# Patient Record
Sex: FEMALE | Race: WHITE | HISPANIC OR LATINO | Employment: STUDENT | URBAN - METROPOLITAN AREA
[De-identification: names, ages, dates, MRNs, and addresses within clinical notes are randomized per-mention and may not be internally consistent; named-entity substitution may affect disease eponyms.]

---
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
## 2022-05-04 ENCOUNTER — APPOINTMENT (OUTPATIENT)
Dept: LAB | Facility: CLINIC | Age: 22
End: 2022-05-04

## 2022-05-04 ENCOUNTER — OCCMED (OUTPATIENT)
Dept: URGENT CARE | Facility: CLINIC | Age: 22
End: 2022-05-04

## 2022-05-04 DIAGNOSIS — Z02.1 PRE-EMPLOYMENT HEALTH SCREENING EXAMINATION: ICD-10-CM

## 2022-05-04 DIAGNOSIS — Z02.1 PRE-EMPLOYMENT HEALTH SCREENING EXAMINATION: Primary | ICD-10-CM

## 2022-05-04 LAB — RUBV IGG SERPL IA-ACNC: 43.9 IU/ML

## 2022-05-04 PROCEDURE — 86735 MUMPS ANTIBODY: CPT

## 2022-05-04 PROCEDURE — 86787 VARICELLA-ZOSTER ANTIBODY: CPT

## 2022-05-04 PROCEDURE — 86762 RUBELLA ANTIBODY: CPT

## 2022-05-04 PROCEDURE — 86765 RUBEOLA ANTIBODY: CPT

## 2022-05-04 PROCEDURE — 86480 TB TEST CELL IMMUN MEASURE: CPT

## 2022-05-04 PROCEDURE — 36415 COLL VENOUS BLD VENIPUNCTURE: CPT

## 2022-05-05 LAB
GAMMA INTERFERON BACKGROUND BLD IA-ACNC: 0.01 IU/ML
M TB IFN-G BLD-IMP: NEGATIVE
M TB IFN-G CD4+ BCKGRND COR BLD-ACNC: 0 IU/ML
M TB IFN-G CD4+ BCKGRND COR BLD-ACNC: 0.01 IU/ML
MEV IGG SER QL: NORMAL
MITOGEN IGNF BCKGRD COR BLD-ACNC: >10 IU/ML
VZV IGG SER IA-ACNC: NORMAL

## 2022-05-06 LAB — MUV IGG SER QL: NORMAL

## 2023-03-15 ENCOUNTER — HOSPITAL ENCOUNTER (EMERGENCY)
Facility: HOSPITAL | Age: 23
Discharge: HOME | End: 2023-03-15
Attending: EMERGENCY MEDICINE | Admitting: EMERGENCY MEDICINE

## 2023-03-15 VITALS
RESPIRATION RATE: 16 BRPM | HEART RATE: 93 BPM | TEMPERATURE: 99.8 F | DIASTOLIC BLOOD PRESSURE: 76 MMHG | SYSTOLIC BLOOD PRESSURE: 137 MMHG | OXYGEN SATURATION: 100 %

## 2023-03-15 DIAGNOSIS — Z77.21 EXPOSURE TO BLOOD OR BODY FLUID: Primary | ICD-10-CM
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
## 2023-03-15 LAB — ALT SERPL W P-5'-P-CCNC: 14 U/L (ref 12–78)

## 2023-03-15 NOTE — ED PROVIDER NOTES
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[employer]   She was taking care of her patient, was obtaining a point-of-care glucose test and after completing this test, she dropped the needle onto her right ring finger  She did notice some bleeding at that time  This did resolve spontaneously  She came in here immediately for evaluation  Denies any other symptoms currently  No other complaints at this time  Remainder of ROS Reviewed and Non-Pertinent    No other complaints for this encounter     - No language barrier    - History obtained from patient and chart   - Reviewed relevant past medical/family/social history  - There are no limitations to the history obtained  Past Medical, Past Surgical History:    has a past medical history of Asthma and Osteochondroma  has a past surgical history that includes Chest surgery; Ankle surgery (Right); and Knee surgery (Bilateral)  Allergies: Allergies   Allergen Reactions   • Adhesive [Medical Tape] Rash   • Iodinated Contrast Media Rash       Social and Family History:     Social History     Substance and Sexual Activity   Alcohol Use Never     Social History     Tobacco Use   Smoking Status Never   Smokeless Tobacco Never     Social History     Substance and Sexual Activity   Drug Use Never       Physical Examination     Vitals:    03/15/23 1733 03/15/23 1734   BP: 137/76    BP Location: Right arm    Pulse: 93    Resp: 16    Temp: 99 8 °F (37 7 °C)    TempSrc: Oral    SpO2: 100% 100%     Physical Exam  Vitals and nursing note reviewed     Constitutional:       General: She is not in acute distress  Appearance: She is well-developed  HENT:      Head: Normocephalic and atraumatic  Eyes:      Conjunctiva/sclera: Conjunctivae normal    Cardiovascular:      Rate and Rhythm: Normal rate and regular rhythm  Heart sounds: No murmur heard  Pulmonary:      Effort: Pulmonary effort is normal  No respiratory distress  Breath sounds: Normal breath sounds  Abdominal:      Palpations: Abdomen is soft  Tenderness: There is no abdominal tenderness  Musculoskeletal:         General: No swelling  Cervical back: Neck supple  Skin:     General: Skin is warm and dry  Capillary Refill: Capillary refill takes less than 2 seconds  Comments: Small punctate abrasion noted on right ring finger, no active bleeding   Neurological:      General: No focal deficit present  Mental Status: She is alert  Psychiatric:         Mood and Affect: Mood normal             Risk Stratification Tools                Orders Placed This Encounter   Procedures   • Hepatitis B surface antigen   • Hepatitis C antibody   • HIV 1/2 AG/AB w Reflex [facility] for 2 yr old and above   • ALT   • Hepatitis B surface antibody       Labs:   Labs Reviewed - No data to display    Imaging:     No orders to display          Procedures   Procedures      MDM:   Medical Decision Making  Edwin Fonseca is a 23 y o  who presents with complaints of workplace exposure    Vital signs are unremarkable, physical exam shows small punctate wound, not actively bleeding, no other evidence of injury    Ddx: Workplace exposure    Plan: Exposure labs, paperwork signed, notified provider taking care of the source patient to draw an exposure panel  Recommended employee health follow-up  Patient was agreeable to this and had no further questions or concerns        Accidental needlestick injury with exposure to body fluid: acute illness or injury  Exposure to blood or body fluid: acute illness or injury  Amount and/or Complexity of Data Reviewed  Labs: ordered  Final Dispo   Final Diagnosis:  1  Exposure to blood or body fluid    2  Accidental needlestick injury with exposure to body fluid      Time reflects when diagnosis was documented in both MDM as applicable and the Disposition within this note     Time User Action Codes Description Comment    3/15/2023  5:54 PM Norah Bailey Add [Z77 21] Exposure to blood or body fluid     3/15/2023  5:54 PM Norah Bailey Add Dorsie [employer]  1XXA] Accidental needlestick injury with exposure to body fluid       ED Disposition     ED Disposition   Discharge    Condition   Stable    Date/Time   Wed Mar 15, 2023  5:54 PM    [address] discharge to home/self care  Follow-up Information     Follow up With Specialties Details Why Contact Info    Marley Haddad DO Family Medicine Call   [address]  [address] [facility]           Medications - No data to display    All details of the evaluation and treatment plan were made clear and additionally all questions and concerns were addressed while under my care  Portions of the record may have been created with voice recognition software  Occasional wrong word or "sound a like" substitutions may have occurred due to the inherent limitations of voice recognition software  Read the chart carefully and recognize, using context, where substitutions have occurred  The attending physician physically available and evaluated the above patient alongside myself          Wero Sutton MD  03/15/23 9845

## 2023-03-15 NOTE — DISCHARGE INSTRUCTIONS
Please follow up with Employee Health within 3 days    Keep the MRN on the patient so you can follow up on the test results and give them to employee health

## 2023-03-16 LAB
HBV SURFACE AB SER-ACNC: 9.1 MIU/ML
HBV SURFACE AG SER QL: NORMAL
HCV AB SER QL: NORMAL
HIV 1+2 AB+HIV1 P24 AG SERPL QL IA: NORMAL
HIV 2 AB SERPL QL IA: NORMAL
HIV1 AB SERPL QL IA: NORMAL
HIV1 P24 AG SERPL QL IA: NORMAL

## 2023-03-20 NOTE — ED ATTENDING ATTESTATION
3/15/2023  IAugustina DO, saw and evaluated the patient  I have discussed the patient with the resident/non-physician practitioner and agree with the resident's/non-physician practitioner's findings, Plan of Care, and MDM as documented in the resident's/non-physician practitioner's note, except where noted  All available labs and Radiology studies were reviewed  I was present for key portions of any procedure(s) performed by the resident/non-physician practitioner and I was immediately available to provide assistance  At this point I agree with the current assessment done in the Emergency Department  I have conducted an independent evaluation of this patient a history and physical is as follows:    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

## 2023-04-26 ENCOUNTER — TELEPHONE (OUTPATIENT)
Dept: OBGYN CLINIC | Facility: HOSPITAL | Age: 23
End: 2023-04-26

## 2023-04-26 NOTE — TELEPHONE ENCOUNTER
Caller: Lucrecia Ruth from [facility]: Lorene Muss    Reason for call: does patient need an EKG or bloodwork for sx clearance?     Call back#: [phone]    Fax # [phone]

## 2023-05-30 ENCOUNTER — APPOINTMENT (OUTPATIENT)
Dept: LAB | Facility: CLINIC | Age: 23
End: 2023-05-30

## 2023-05-30 DIAGNOSIS — M25.561 RIGHT KNEE PAIN, UNSPECIFIED CHRONICITY: ICD-10-CM

## 2023-05-30 DIAGNOSIS — D16.21 OSTEOCHONDROMA OF FEMUR, RIGHT: ICD-10-CM

## 2023-05-30 LAB
ALBUMIN SERPL BCP-MCNC: 3.7 G/DL (ref 3.5–5)
ALP SERPL-CCNC: 46 U/L (ref 46–116)
ALT SERPL W P-5'-P-CCNC: 14 U/L (ref 12–78)
ANION GAP SERPL CALCULATED.3IONS-SCNC: 2 MMOL/L (ref 4–13)
APTT PPP: 28 SECONDS (ref 23–37)
AST SERPL W P-5'-P-CCNC: 13 U/L (ref 5–45)
BASOPHILS # BLD AUTO: 0.03 THOUSANDS/ÂΜL (ref 0–0.1)
BASOPHILS NFR BLD AUTO: 1 % (ref 0–1)
BILIRUB SERPL-MCNC: 1.83 MG/DL (ref 0.2–1)
BUN SERPL-MCNC: 13 MG/DL (ref 5–25)
CALCIUM SERPL-MCNC: 9.3 MG/DL (ref 8.3–10.1)
CHLORIDE SERPL-SCNC: 109 MMOL/L (ref 96–108)
CO2 SERPL-SCNC: 26 MMOL/L (ref 21–32)
CREAT SERPL-MCNC: 0.73 MG/DL (ref 0.6–1.3)
EOSINOPHIL # BLD AUTO: 0.16 THOUSAND/ÂΜL (ref 0–0.61)
EOSINOPHIL NFR BLD AUTO: 3 % (ref 0–6)
ERYTHROCYTE [DISTWIDTH] IN BLOOD BY AUTOMATED COUNT: 12 % (ref 11.6–15.1)
GFR SERPL CREATININE-BSD FRML MDRD: 118 ML/MIN/1.73SQ M
GLUCOSE P FAST SERPL-MCNC: 84 MG/DL (ref 65–99)
HCT VFR BLD AUTO: 36.3 % (ref 34.8–46.1)
HGB BLD-MCNC: 11.7 G/DL (ref 11.5–15.4)
IMM GRANULOCYTES # BLD AUTO: 0.01 THOUSAND/UL (ref 0–0.2)
IMM GRANULOCYTES NFR BLD AUTO: 0 % (ref 0–2)
INR PPP: 1.05 (ref 0.84–1.19)
LYMPHOCYTES # BLD AUTO: 1.55 THOUSANDS/ÂΜL (ref 0.6–4.47)
LYMPHOCYTES NFR BLD AUTO: 33 % (ref 14–44)
MCH RBC QN AUTO: 30.5 PG (ref 26.8–34.3)
MCHC RBC AUTO-ENTMCNC: 32.2 G/DL (ref 31.4–37.4)
MCV RBC AUTO: 95 FL (ref 82–98)
MONOCYTES # BLD AUTO: 0.37 THOUSAND/ÂΜL (ref 0.17–1.22)
MONOCYTES NFR BLD AUTO: 8 % (ref 4–12)
NEUTROPHILS # BLD AUTO: 2.63 THOUSANDS/ÂΜL (ref 1.85–7.62)
NEUTS SEG NFR BLD AUTO: 55 % (ref 43–75)
NRBC BLD AUTO-RTO: 0 /100 WBCS
PLATELET # BLD AUTO: 177 THOUSANDS/UL (ref 149–390)
PMV BLD AUTO: 10.8 FL (ref 8.9–12.7)
POTASSIUM SERPL-SCNC: 3.9 MMOL/L (ref 3.5–5.3)
PROT SERPL-MCNC: 7 G/DL (ref 6.4–8.4)
PROTHROMBIN TIME: 14 SECONDS (ref 11.6–14.5)
RBC # BLD AUTO: 3.84 MILLION/UL (ref 3.81–5.12)
SODIUM SERPL-SCNC: 137 MMOL/L (ref 135–147)
WBC # BLD AUTO: 4.75 THOUSAND/UL (ref 4.31–10.16)

## 2023-06-01 NOTE — PRE-PROCEDURE INSTRUCTIONS
Pre-Surgery Instructions:   Medication Instructions   • ibuprofen (MOTRIN) 200 mg tablet Stop taking 7 days prior to surgery  Medication instructions for day surgery reviewed  Please use only a sip of water to take your instructed medications  Avoid all over the counter vitamins, supplements and NSAIDS for one week prior to surgery per anesthesia guidelines  Tylenol is ok to take as needed  You will receive a call one business day prior to surgery with an arrival time and hospital directions  If your surgery is scheduled on a Monday, the hospital will be calling you on the Friday prior to your surgery  If you have not heard from anyone by 8pm, please call the hospital supervisor through the hospital  at [phone]  Wayne Interiano [phone])  Do not eat or drink anything after midnight the night before your surgery, including candy, mints, lifesavers, or chewing gum  Do not drink alcohol 24hrs before your surgery  Try not to smoke at least 24hrs before your surgery  Follow the pre surgery showering instructions as listed in the [facility] Surgical Experience Booklet” or otherwise provided by your surgeon's office  Do not shave the surgical area 24 hours before surgery  Do not apply any lotions, creams, including makeup, cologne, deodorant, or perfumes after showering on the day of your surgery  No contact lenses, eye make-up, or artificial eyelashes  Remove nail polish, including gel polish, and any artificial, gel, or acrylic nails if possible  Remove all jewelry including rings and body piercing jewelry  Wear causal clothing that is easy to take on and off  Consider your type of surgery  Keep any valuables, jewelry, piercings at home  Please bring any specially ordered equipment (sling, braces) if indicated  Arrange for a responsible person to drive you to and from the hospital on the day of your surgery  Visitor Guidelines discussed       Call the surgeon's office with any new illnesses, exposures, or additional questions prior to surgery  Please reference your [facility] Surgical Experience Booklet” for additional information to prepare for your upcoming surgery

## 2023-06-05 ENCOUNTER — ANESTHESIA EVENT (OUTPATIENT)
Dept: PERIOP | Facility: HOSPITAL | Age: 23
End: 2023-06-05
Payer: COMMERCIAL

## 2023-06-07 ENCOUNTER — HOSPITAL ENCOUNTER (OUTPATIENT)
Facility: HOSPITAL | Age: 23
Setting detail: OUTPATIENT SURGERY
Discharge: HOME | End: 2023-06-07
Attending: STUDENT IN AN ORGANIZED HEALTH CARE EDUCATION/TRAINING PROGRAM | Admitting: STUDENT IN AN ORGANIZED HEALTH CARE EDUCATION/TRAINING PROGRAM
Payer: COMMERCIAL

## 2023-06-07 ENCOUNTER — ANESTHESIA (OUTPATIENT)
Dept: PERIOP | Facility: HOSPITAL | Age: 23
End: 2023-06-07
Payer: COMMERCIAL

## 2023-06-07 ENCOUNTER — HOSPITAL ENCOUNTER (OUTPATIENT)
Dept: RADIOLOGY | Facility: HOSPITAL | Age: 23
Setting detail: OUTPATIENT SURGERY
Discharge: HOME | End: 2023-06-07
Payer: COMMERCIAL

## 2023-06-07 VITALS
RESPIRATION RATE: 16 BRPM | HEART RATE: 69 BPM | SYSTOLIC BLOOD PRESSURE: 101 MMHG | HEIGHT: 64 IN | TEMPERATURE: 97.6 F | BODY MASS INDEX: 18.74 KG/M2 | WEIGHT: 109.79 LBS | DIASTOLIC BLOOD PRESSURE: 66 MMHG | OXYGEN SATURATION: 100 %

## 2023-06-07 DIAGNOSIS — D16.9 OSTEOCHONDROMA: Primary | ICD-10-CM

## 2023-06-07 DIAGNOSIS — D16.21 OSTEOCHONDROMA OF FEMUR, RIGHT: ICD-10-CM

## 2023-06-07 DIAGNOSIS — Z47.89 AFTERCARE FOLLOWING SURGERY OF THE MUSCULOSKELETAL SYSTEM: ICD-10-CM

## 2023-06-07 LAB
EXT PREGNANCY TEST URINE: NEGATIVE
EXT. CONTROL: NORMAL

## 2023-06-07 PROCEDURE — 27355 REMOVE FEMUR LESION: CPT

## 2023-06-07 PROCEDURE — 81025 URINE PREGNANCY TEST: CPT | Performed by: STUDENT IN AN ORGANIZED HEALTH CARE EDUCATION/TRAINING PROGRAM

## 2023-06-07 PROCEDURE — 27355 REMOVE FEMUR LESION: CPT | Performed by: STUDENT IN AN ORGANIZED HEALTH CARE EDUCATION/TRAINING PROGRAM

## 2023-06-07 PROCEDURE — NC001 PR NO CHARGE: Performed by: STUDENT IN AN ORGANIZED HEALTH CARE EDUCATION/TRAINING PROGRAM

## 2023-06-07 PROCEDURE — 88307 TISSUE EXAM BY PATHOLOGIST: CPT | Performed by: PATHOLOGY

## 2023-06-07 PROCEDURE — 88311 DECALCIFY TISSUE: CPT | Performed by: PATHOLOGY

## 2023-06-07 RX ORDER — ROCURONIUM BROMIDE 10 MG/ML
INJECTION, SOLUTION INTRAVENOUS AS NEEDED
Status: DISCONTINUED | OUTPATIENT
Start: 2023-06-07 | End: 2023-06-07

## 2023-06-07 RX ORDER — ONDANSETRON 2 MG/ML
4 INJECTION INTRAMUSCULAR; INTRAVENOUS ONCE AS NEEDED
Status: DISCONTINUED | OUTPATIENT
Start: 2023-06-07 | End: 2023-06-07 | Stop reason: HOSPADM

## 2023-06-07 RX ORDER — ONDANSETRON 2 MG/ML
INJECTION INTRAMUSCULAR; INTRAVENOUS AS NEEDED
Status: DISCONTINUED | OUTPATIENT
Start: 2023-06-07 | End: 2023-06-07

## 2023-06-07 RX ORDER — FENTANYL CITRATE 50 UG/ML
INJECTION, SOLUTION INTRAMUSCULAR; INTRAVENOUS AS NEEDED
Status: DISCONTINUED | OUTPATIENT
Start: 2023-06-07 | End: 2023-06-07

## 2023-06-07 RX ORDER — TRAMADOL HYDROCHLORIDE 50 MG/1
50 TABLET ORAL EVERY 6 HOURS PRN
Qty: 40 TABLET | Refills: 0 | Status: SHIPPED | OUTPATIENT
Start: 2023-06-07 | End: 2023-06-17

## 2023-06-07 RX ORDER — SENNOSIDES 8.6 MG
650 CAPSULE ORAL EVERY 8 HOURS
Qty: 30 TABLET | Refills: 0 | Status: SHIPPED | OUTPATIENT
Start: 2023-06-07 | End: 2023-06-17

## 2023-06-07 RX ORDER — ONDANSETRON 2 MG/ML
4 INJECTION INTRAMUSCULAR; INTRAVENOUS EVERY 6 HOURS PRN
Status: DISCONTINUED | OUTPATIENT
Start: 2023-06-07 | End: 2023-06-07 | Stop reason: HOSPADM

## 2023-06-07 RX ORDER — MIDAZOLAM HYDROCHLORIDE 2 MG/2ML
INJECTION, SOLUTION INTRAMUSCULAR; INTRAVENOUS AS NEEDED
Status: DISCONTINUED | OUTPATIENT
Start: 2023-06-07 | End: 2023-06-07

## 2023-06-07 RX ORDER — FENTANYL CITRATE/PF 50 MCG/ML
25 SYRINGE (ML) INJECTION
Status: DISCONTINUED | OUTPATIENT
Start: 2023-06-07 | End: 2023-06-07 | Stop reason: HOSPADM

## 2023-06-07 RX ORDER — SODIUM CHLORIDE 9 MG/ML
125 INJECTION, SOLUTION INTRAVENOUS CONTINUOUS
Status: DISCONTINUED | OUTPATIENT
Start: 2023-06-07 | End: 2023-06-07 | Stop reason: HOSPADM

## 2023-06-07 RX ORDER — CHLORHEXIDINE GLUCONATE 0.12 MG/ML
15 RINSE ORAL ONCE
Status: COMPLETED | OUTPATIENT
Start: 2023-06-07 | End: 2023-06-07

## 2023-06-07 RX ORDER — MAGNESIUM HYDROXIDE 1200 MG/15ML
LIQUID ORAL AS NEEDED
Status: DISCONTINUED | OUTPATIENT
Start: 2023-06-07 | End: 2023-06-07 | Stop reason: HOSPADM

## 2023-06-07 RX ORDER — PROPOFOL 10 MG/ML
INJECTION, EMULSION INTRAVENOUS AS NEEDED
Status: DISCONTINUED | OUTPATIENT
Start: 2023-06-07 | End: 2023-06-07

## 2023-06-07 RX ORDER — ACETAMINOPHEN 325 MG/1
975 TABLET ORAL EVERY 8 HOURS
Status: DISCONTINUED | OUTPATIENT
Start: 2023-06-07 | End: 2023-06-07 | Stop reason: HOSPADM

## 2023-06-07 RX ORDER — DOCUSATE SODIUM 100 MG/1
100 CAPSULE, LIQUID FILLED ORAL 2 TIMES DAILY
Qty: 30 CAPSULE | Refills: 0 | Status: SHIPPED | OUTPATIENT
Start: 2023-06-07

## 2023-06-07 RX ORDER — CEFAZOLIN SODIUM 2 G/50ML
2000 SOLUTION INTRAVENOUS ONCE
Status: COMPLETED | OUTPATIENT
Start: 2023-06-07 | End: 2023-06-07

## 2023-06-07 RX ORDER — NEOSTIGMINE METHYLSULFATE 1 MG/ML
INJECTION INTRAVENOUS AS NEEDED
Status: DISCONTINUED | OUTPATIENT
Start: 2023-06-07 | End: 2023-06-07

## 2023-06-07 RX ORDER — NAPROXEN 500 MG/1
500 TABLET ORAL 2 TIMES DAILY WITH MEALS
Qty: 20 TABLET | Refills: 0 | Status: SHIPPED | OUTPATIENT
Start: 2023-06-07 | End: 2023-06-17

## 2023-06-07 RX ORDER — DEXAMETHASONE SODIUM PHOSPHATE 10 MG/ML
INJECTION, SOLUTION INTRAMUSCULAR; INTRAVENOUS AS NEEDED
Status: DISCONTINUED | OUTPATIENT
Start: 2023-06-07 | End: 2023-06-07 | Stop reason: HOSPADM

## 2023-06-07 RX ORDER — LIDOCAINE HYDROCHLORIDE 10 MG/ML
INJECTION, SOLUTION EPIDURAL; INFILTRATION; INTRACAUDAL; PERINEURAL AS NEEDED
Status: DISCONTINUED | OUTPATIENT
Start: 2023-06-07 | End: 2023-06-07

## 2023-06-07 RX ORDER — ONDANSETRON 4 MG/1
4 TABLET, FILM COATED ORAL EVERY 8 HOURS PRN
Qty: 20 TABLET | Refills: 0 | Status: SHIPPED | OUTPATIENT
Start: 2023-06-07

## 2023-06-07 RX ORDER — GLYCOPYRROLATE 0.2 MG/ML
INJECTION INTRAMUSCULAR; INTRAVENOUS AS NEEDED
Status: DISCONTINUED | OUTPATIENT
Start: 2023-06-07 | End: 2023-06-07

## 2023-06-07 RX ADMIN — NEOSTIGMINE METHYLSULFATE 3 MG: 1 INJECTION INTRAVENOUS at 11:09

## 2023-06-07 RX ADMIN — ONDANSETRON 4 MG: 2 INJECTION INTRAMUSCULAR; INTRAVENOUS at 10:39

## 2023-06-07 RX ADMIN — ROCURONIUM BROMIDE 50 MG: 10 INJECTION, SOLUTION INTRAVENOUS at 10:39

## 2023-06-07 RX ADMIN — CHLORHEXIDINE GLUCONATE 15 ML: 1.2 RINSE ORAL at 08:33

## 2023-06-07 RX ADMIN — LIDOCAINE HYDROCHLORIDE 100 MG: 10 INJECTION, SOLUTION EPIDURAL; INFILTRATION; INTRACAUDAL; PERINEURAL at 10:39

## 2023-06-07 RX ADMIN — SODIUM CHLORIDE 125 ML/HR: 0.9 INJECTION, SOLUTION INTRAVENOUS at 08:44

## 2023-06-07 RX ADMIN — PROPOFOL 200 MG: 10 INJECTION, EMULSION INTRAVENOUS at 10:39

## 2023-06-07 RX ADMIN — FENTANYL CITRATE 50 MCG: 50 INJECTION INTRAMUSCULAR; INTRAVENOUS at 11:36

## 2023-06-07 RX ADMIN — GLYCOPYRROLATE 0.2 MG: 0.2 INJECTION INTRAMUSCULAR; INTRAVENOUS at 11:24

## 2023-06-07 RX ADMIN — CEFAZOLIN SODIUM 2000 MG: 2 SOLUTION INTRAVENOUS at 10:42

## 2023-06-07 RX ADMIN — GLYCOPYRROLATE 0.6 MG: 0.2 INJECTION INTRAMUSCULAR; INTRAVENOUS at 11:09

## 2023-06-07 RX ADMIN — MIDAZOLAM 2 MG: 1 INJECTION INTRAMUSCULAR; INTRAVENOUS at 10:36

## 2023-06-07 RX ADMIN — ACETAMINOPHEN 975 MG: 325 TABLET ORAL at 12:53

## 2023-06-07 RX ADMIN — FENTANYL CITRATE 50 MCG: 50 INJECTION INTRAMUSCULAR; INTRAVENOUS at 10:39

## 2023-06-07 RX ADMIN — NEOSTIGMINE METHYLSULFATE 1 MG: 1 INJECTION INTRAVENOUS at 11:24

## 2023-06-07 NOTE — DISCHARGE INSTR - AVS FIRST PAGE
POSTOPERATIVE INSTRUCTIONS     MEDICATIONS:  Resume all home medications unless otherwise instructed by your surgeon  Pain Medication:  Tramadol for breakthrough pain  Possible side effects include nausea, constipation, and urinary retention  If you experience these side effects, please call our office for assistance  Pain med refills are authorized only during office hours (8am-4pm, Mon-Fri)  Anti-Inflammatory:  Tylenol and Naproxen as needed for pain around-the-clock  Take with food  Stop if you experience nausea, reflux, or stomach pain  Nausea Medication:  Zofran ODT 4 mg, 1 tablet every 6 hours as needed  Fill prescription ONLY if you expericnce severe nausea  Blood Clot Prevention:  Not Necessary  Pump your foot up and down 20 times per hour while you are less mobile  WOUND CARE:  Keep the dressing clean and dry  Light drainage may occur the first 2 days postop  DO NOT REMOVE THE DRESSINGS until you are seen in our office  Cover the bandages appropriately while washing to keep them from getting wet  Please call our office ([phone]) if you experience either of the following:  Sudden increase in swelling, redness, or warmth at the surgical site  Excessive incisional drainage that persists beyond the 3rd day after surgery  Oral temperature greater than 101 degrees, not relieved with Tylenol  Shortness of breath, chest pain, nausea, or any other concerning symptoms    SWELLING CONTROL:  Cold Therapy: The cold therapy device may be used either continuously or only as needed, according to your preference  Do not let the pad directly touch your skin  Alternatively, apply ice (20 min on, 20 min off) as often as you feel is necessary  Elevation:  Elevate the entire leg above heart level  Place pillows under your ankle to keep your knee straight  Compression:  Apply ACE wraps or a thigh-length compression stocking as needed      RANGE OF MOTION:  You are allowed FULL RANGE OF MOTION as "tolerated  IMMOBILIZATION:  No sling or brace  ACE wrap for compression can be taken down as needed    ACTIVITY:   BEAR FULL WEIGHT AS TOLERATED on the operative leg  Use crutches to assist only as needed  Using Crutches on Stairs:  Going up, lead with your \"good\" (nonoperative) leg  Going down, lead with your \"bad\" (operative) leg  Use a hand rail when available  Knee Extension:  Place a rolled towel or pillow under your ankle for 20-30 minutes 3-5 times per day  This will help to maintain full knee extension  Quad Sets:  Sit or lie with your knee straight  Tighten your quadriceps (front thigh) muscle  Hold for 3 seconds, then relax  Repeat 20 times per hour while awake  PHYSICAL THERAPY:  You will not need physical therapy  If interested, a prescription can be provided for you in the office    FOLLOW-UP APPOINTMENT:  10-14 days after surgery with:    [facility]  [phone]    "

## 2023-06-07 NOTE — ANESTHESIA POSTPROCEDURE EVALUATION
"Post-Op Assessment Note    CV Status:  Stable  Pain Score: 2    Pain management: adequate     Mental Status:  Alert and awake   Hydration Status:  Euvolemic and stable   PONV Controlled:  Controlled   Airway Patency:  Patent      Post Op Vitals Reviewed: Yes      Staff: Anesthesiologist         No notable events documented      BP      Temp      Pulse     Resp      SpO2      /66   Pulse 69   Temp 97 6 °F (36 4 °C) (Temporal)   Resp 16   Ht 5' 3 75\" (1 619 m)   Wt 49 8 kg (109 lb 12 6 oz)   SpO2 100%   BMI 18 99 kg/m²     "

## 2023-06-07 NOTE — H&P
H&P Exam - Orthopedics   Raad Cuellar 23 y o  female MRN: [id number]  Unit/Bed#: E 03    Assessment/Plan   Assessment:  23 y o  female with osteochondroma of right distal medial femur    Plan: To remove tumor of bone to right distal femur    History of Present Illness   HPI:  Raad Cuellar is a 23 y o  female who presents with history of MHE with a painful osteochondroma of right distal medial femur  Since last office visit on 4/13, she was seen by her PCP; determined no CV risk factors with healthy adult preventative exam     She has been NPO since midnight, has no new symptoms or concerns this morning, and is keen to proceed with surgery today  Historical Information  Review Of Systems:   · Skin: Normal  · Neuro: See HPI  · Musculoskeletal: See HPI  · 14 point review of systems negative except as stated above     Past Medical History:   Past Medical History:   Diagnosis Date   • Asthma    • Osteochondroma      Past Surgical History:   Past Surgical History:   Procedure Laterality Date   • ANKLE SURGERY Right    • CHEST SURGERY      removal of bony tumor on ribs   • KNEE SURGERY Bilateral        Family History:  Family history reviewed and non-contributory  History reviewed  No pertinent family history      Social History:  Social History     Socioeconomic History   • Marital status: Single     Spouse name: None   • Number of children: None   • Years of education: None   • Highest education level: None   Occupational History   • None   Tobacco Use   • Smoking status: Never   • Smokeless tobacco: Never   Vaping Use   • Vaping Use: Never used   Substance and Sexual Activity   • Alcohol use: Never   • Drug use: Never   • Sexual activity: Never   Other Topics Concern   • None   Social History Narrative   • None     Social Determinants of Health     Financial Resource Strain: Not on file   Food Insecurity: Not on file   Transportation Needs: Not on file   Physical Activity: Not on file   Stress: Not on file "  Social Connections: Not on file   Intimate Partner Violence: Not on file   Housing Stability: Not on file       Allergies: Allergies   Allergen Reactions   • Peanut Butter Flavor - Food Allergy Throat Swelling   • Adhesive [Medical Tape] Rash   • Iodinated Contrast Media Rash   • Povidone Iodine Rash     Labs:  0   Lab Value Date/Time    HCT 36 3 05/30/2023 0847    HGB 11 7 05/30/2023 0847    INR 1 05 05/30/2023 0847    WBC 4 75 05/30/2023 0847     Meds:  No current facility-administered medications for this encounter  Blood Culture:   No results found for: \"BLOODCX\"    Wound Culture:   No results found for: \"WOUNDCULT\"    Ins and Outs:  No intake/output data recorded  Physical Exam  There were no vitals taken for this visit  There were no vitals taken for this visit    Gen: No acute distress, resting comfortably in bed  HEENT: Eyes clear, moist mucus membranes, hearing intact  Respiratory: No audible wheezing or stridor  Cardiovascular: Well Perfused peripherally, 2+ distal pulse  Abdomen: nondistended, no peritoneal signs  Ortho Exam:   Right lower extremity  Palpable bony mass to medial distal thigh   No erythema, ecchymosis, edema, or rash over surgical site  Motor intact to EHL/FHL/TA/GS  Sensation intact to light touch to dp/sp/tib/eva/saph distributions  Toes warm and well perfused with brisk capillary refill    Lab Results: Reviewed  Imaging: Reviewed    Abel Barragan PA-C  "

## 2023-06-07 NOTE — ANESTHESIA PREPROCEDURE EVALUATION
Procedure:  REMOVAL TUMOR BONE right distal femur osteochondroma (Right: Thigh)    Relevant Problems   PULMONARY   (+) Asthma      Other   (+) Osteochondroma        Physical Exam    Airway    Mallampati score: II  TM Distance: >3 FB  Neck ROM: full     Dental   No notable dental hx     Cardiovascular  Rhythm: regular, Rate: normal, Cardiovascular exam normal    Pulmonary  Pulmonary exam normal Breath sounds clear to auscultation,     Other Findings        Anesthesia Plan  ASA Score- 2     Anesthesia Type- general with ASA Monitors  Additional Monitors:   Airway Plan:           Plan Factors-    Chart reviewed  Existing labs reviewed  Patient summary reviewed  Patient is not a current smoker  Patient not instructed to abstain from smoking on day of procedure  Patient did not smoke on day of surgery  There is medical exclusion for perioperative obstructive sleep apnea risk education  Induction- intravenous  Postoperative Plan- Plan for postoperative opioid use  Informed Consent- Anesthetic plan and risks discussed with patient

## 2023-06-07 NOTE — OP NOTE
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]    Post-Op Diagnosis Codes:     * Osteochondroma of femur, right [D16 21]    Procedure(s):  Right - REMOVAL TUMOR BONE right distal femur osteochondroma    Specimen(s):  ID Type Source Tests Collected by Time Destination   1 : right femur bone tumor Tissue Bone TISSUE EXAM Gisela Frias DO 2023 1057        Estimated Blood Loss:   Minimal    Drains:  * No LDAs found *    Anesthesia Type:   Choice    Operative Indications:  Osteochondroma of femur, right [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]    Patient is a 82-year-old female with a history of multiple hereditary exostosis  His previous history having multiple osteochondromas removed from her body including 1 off of the ribs that was impeding on her ventricle  She has begun having significant pain at 1 and osteochondroma sites at her right distal medial femur  Due to the continued pain and discomfort she was indicated for surgical resection  The patient has failed non operative measures and has opted for surgical intervention  Risks and benefits of the treatment options and surgery were discussed in detail with the patient by Dr Armando Agustin  The risks of surgery including infection, bleeding, injury to nerves, injury to the vessels, excess scar tissue formation, risk of failure of the procedure, the possible need for further surgery, and potential risk of loss of limb and life  After weighing all the treatment options available, the patient has opted for surgical intervention and informed consent was obtained         Operative Findings:  Osteochondroma right distal femur    Complications:   None    Procedure and Technique:  Patient was identified in the preop holding area, right lower extremity was marked for surgery  Patient was brought to the operating room where she was transferred to the operating room table  Perioperative antibiotics were given, Ancef  Right lower extremity was prepped and draped in standard sterile fashion after the patient was intubated and sedated in the standard manner  Tourniquet was placed around the right thigh  Timeout was performed identifying all team members in the room the right lower extremities the proper operative extremity    Patient previous medial incision over the distal thigh, this was once again used prolongated proximally and distally approximately 2 cm in each direction  This was after using Esmarch and tourniquet to exsanguinate the thigh and inflated the tourniquet to 250 mmHg  1% lidocaine with epinephrine was injected in the skin incision site  Skin was sharply incised down through subcutaneous tissue down to the fascia  Fascia of the vastus medialis oblique us was identified, abductor amandeep tendon was identified  Vastus medialis was elevated off the posterior compartment and the osteochondroma was identified  Anice Sung was resected around the head of the osteochondroma, soft tissue and deep tissue was protected  Meticulous hemostasis was obtained  Retractors were placed surrounding the neck of the osteochondroma and protecting the posterior soft tissue neurovascular structures, using 1/4 inch curved osteotome at the neck of the mass, this was resected with amount off of the distal medial femur  Tumor was completely resected and sent on the back table  Rongeur was then used to smooth out the branch site to a normal smooth contour of the distal medial femur  Wound was copiously irrigated with normal saline, bone wax was placed within the resected region  Tourniquet was let down, hemostasis was obtained  Deep tissue and muscle and subcutaneous tissue was injected with cocktail    Deep tissue was closed with #1 Vicryl, subcutaneous tissue was closed with 2-0 Monocryl, skin was closed with a running subcuticular 3-0 Monocryl  Skin was cleansed and dried, skin glue was placed  Mepilex dressing was placed  Knee was wrapped with soft roll and Ace  Patient was awoken from anesthesia without complication sent to the [facility] [place] [facility]     I was present for the entire procedure  and A qualified resident physician was not available  Physician assistant was integral for soft tissue handling retraction and suturing      Patient Disposition:  PACU  and extubated and stable        SIGNATURE: Bora Falcon DO  DATE: June 7, 2023  TIME: 11:26 AM

## 2023-06-14 PROCEDURE — 88307 TISSUE EXAM BY PATHOLOGIST: CPT | Performed by: PATHOLOGY

## 2023-06-14 PROCEDURE — 88311 DECALCIFY TISSUE: CPT | Performed by: PATHOLOGY

## 2023-06-22 ENCOUNTER — OFFICE VISIT (OUTPATIENT)
Dept: OBGYN CLINIC | Facility: MEDICAL CENTER | Age: 23
End: 2023-06-22

## 2023-06-22 VITALS
DIASTOLIC BLOOD PRESSURE: 78 MMHG | WEIGHT: 109 LBS | HEIGHT: 64 IN | BODY MASS INDEX: 18.61 KG/M2 | SYSTOLIC BLOOD PRESSURE: 130 MMHG | HEART RATE: 76 BPM

## 2023-06-22 DIAGNOSIS — Q78.6 EXOSTOSIS, MULTIPLE HEREDITARY: ICD-10-CM

## 2023-06-22 DIAGNOSIS — D48.0 NEOPLASM OF UNCERTAIN BEHAVIOR OF BONE AND ARTICULAR CARTILAGE: ICD-10-CM

## 2023-06-22 DIAGNOSIS — D16.21 OSTEOCHONDROMA OF FEMUR, RIGHT: Primary | ICD-10-CM

## 2023-06-22 PROCEDURE — 99024 POSTOP FOLLOW-UP VISIT: CPT | Performed by: STUDENT IN AN ORGANIZED HEALTH CARE EDUCATION/TRAINING PROGRAM

## 2023-06-22 NOTE — PROGRESS NOTES
Orthopedic Oncology Post Operative Office Note  Ronit Price (51 y o  female)   : 2000   MRN: [id number]   Encounter Date: 2023  Dr Shane Troncoso DO, Orthopedic Surgeon  Orthopedic Oncology & [facility]   DOS: 2023  Procedure performed:  Removal tumor bone right distal femur osteochondroma    Impression/Plan: 23 y o  female with a history of 2 weeks s/p Removal tumor bone right distal femur osteochondroma  Final pathology consistent with osteochondroma  1  S/p Removal tumor bone right distal femur osteochondroma  · Wound Care   · Continue current care , OK to shower  , Dab dry with towel , Steristrips will fall off on their own , No soaking or bathing for another 2 weeks  , Encouraged scar care with vitamin E oil  · Pain control: NSAIDs/Tylenol as needed  · Continue ice packs/elevation  · Can continue compression with ACE wrap or compression stockings  · Physical therapy: n/a   · WBAT to RLE    2  MHE- Multiple Hereditery Exostosis  -  Continue current management  - we will continue to monitor and manage her from an orthopedic oncology perspective  -Will monitor distal posterior lateral osteochondroma of the femur with its 5 mm cartilage  -We will also in the near future after follow-up surgically will obtain an updated image of the patient's chest and pelvis to rule out any internal osteochondromas, had osteochondromas removed from her left rib that were pressing on her ventricle previously  - MRI of left knee wo contrast was ordered for 1 year  - CT of chest, abdomen, pelvis wo contrast was ordered for 1 year     3  Comorbidity, including: asthma  - continue current management     Return in about 1 year (around 2024) for after MRI and CT scan is complete    · for evaluation without x-ray     Subjective:  23 y o  female 2 weeks s/p Removal tumor bone right distal femur osteochondroma     Pain/Complaints: deep knee flexion exercises such as ambulating down "stairs   Numbness/weakness extremity: none   Physical Therapy Progress: n/a   DVT ppx: n/a   Abx: n/a   Eating/Drinking improving  Bowel/Bladder: WNL   Denies fever/chills, numbness/tingling, injury/trauma, night sweats    Physical Exam:  Height: 5' 3 75\" (161 9 cm)  Weight - Scale: 49 4 kg (109 lb)  BMI (Calculated): 18 9  BSA (Calculated - m2): 1 51 sq meters     Vitals:    06/22/23 1700   BP: 130/78   Pulse: 76     Body mass index is 18 86 kg/m²  General: alert and oriented x 3; well nourished/well developed; no apparent distress  Extremity:   right knee(s) -   Patient ambulates with steady gait pattern  Uses No assistive device  No anatomical deformity  Skin is warm and dry to touch with no signs of erythema, ecchymosis, or infection  No soft tissue swelling or effusion noted  ROM (0° - 120°)  MMT: 5/5 throughout  Flexor and extensor mechanisms are intact   Knee is stable to varus and valgus stress  Patella tracks centrally without palpable crepitus  Calf compartments are soft and supple  - Macario's sign  2+ DP and PT pulses with brisk capillary refill to the toes  Sural, saphenous, tibial, superficial, and deep peroneal motor and sensory distributions intact  Sensation light touch intact distally    Labs: n/a    Pathology: FINAL  Final Diagnosis   A  Right femur, bone tumor, excision:  -Consistent with osteochondroma     -Inked resection margin uninvolved  Microbiology:  Cultures: n/a    Imaging:   Study type: XR  Location: right knee  Date: 04/13/2023  I have personally reviewed all relevant imaging  My impression is as follows:  Multiple distal femoral and proximal tibial osteochondromas again seen      Scribe Attestation    I,:  Samra Musa am acting as a scribe while in the presence of the attending physician :       I,:  Cesar Payne DO personally performed the services described in this documentation    as scribed in my presence :          Dr Emmy Banuelos DO, Orthopedic " Surgeon  Orthopedic Oncology & [facility]   Phone:[phone] DFN:[phone]

## (undated) DEVICE — DRAPE C-ARM X-RAY

## (undated) DEVICE — 3000CC GUARDIAN II: Brand: GUARDIAN

## (undated) DEVICE — PENCIL ELECTROSURG E-Z CLEAN -0035H

## (undated) DEVICE — DRESSING MEPILEX AG BORDER POST-OP 4 X 6 IN

## (undated) DEVICE — 10FR FRAZIER SUCTION HANDLE: Brand: CARDINAL HEALTH

## (undated) DEVICE — WEBRIL 6 IN UNSTERILE

## (undated) DEVICE — NEEDLE HYPO 22G X 1-1/2 IN

## (undated) DEVICE — 3M™ STERI-DRAPE™ U-DRAPE 1015: Brand: STERI-DRAPE™

## (undated) DEVICE — GLOVE SRG BIOGEL 7.5

## (undated) DEVICE — SUT VICRYL 1 CT-1 27 IN J261H

## (undated) DEVICE — GLOVE SRG BIOGEL 6.5

## (undated) DEVICE — INTENDED FOR TISSUE SEPARATION, AND OTHER PROCEDURES THAT REQUIRE A SHARP SURGICAL BLADE TO PUNCTURE OR CUT.: Brand: BARD-PARKER ® CARBON RIB-BACK BLADES

## (undated) DEVICE — DRAPE SHEET THREE QUARTER

## (undated) DEVICE — SCD SEQUENTIAL COMPRESSION COMFORT SLEEVE MEDIUM KNEE LENGTH: Brand: KENDALL SCD

## (undated) DEVICE — SPECIMEN CONTAINER STERILE PEEL PACK

## (undated) DEVICE — CUFF TOURNIQUET 30 X 4 IN QUICK CONNECT DISP 1BLA

## (undated) DEVICE — SUT MONOCRYL 2-0 CT-1 36 IN Y945H

## (undated) DEVICE — SYRINGE 20ML LL

## (undated) DEVICE — ADHESIVE SKIN HIGH VISCOSITY EXOFIN 1ML

## (undated) DEVICE — COBAN 6 IN STERILE

## (undated) DEVICE — TELFA NON-ADHERENT ABSORBENT DRESSING: Brand: TELFA

## (undated) DEVICE — ACE WRAP 6 IN UNSTERILE

## (undated) DEVICE — PADDING CAST 6IN COTTON STRL

## (undated) DEVICE — GLOVE INDICATOR PI UNDERGLOVE SZ 7 BLUE

## (undated) DEVICE — BETHLEHEM TOTAL HIP, KIT: Brand: CARDINAL HEALTH

## (undated) DEVICE — GLOVE SRG BIOGEL 8